# Patient Record
(demographics unavailable — no encounter records)

---

## 2025-02-19 NOTE — ASSESSMENT
[FreeTextEntry1] : Reviewed on July 29, 2024. EKG as noted above. EKG from outside office also reviewed which has shown normal sinus rhythm.  Date of report 11/10/2021. Labs which were done May 23, 2024 had shown hemoglobin A1c 5.4.   HDL 70 total cholesterol 246 triglycerides 65. 10-year ASCVD risk intermediate risk profile.  Reviewed on August 15, 2024. Coronary calcium score and carotid Doppler study reviewed.  Reviewed on February 19, 2025. Recent labs.  2/14/2025.   HDL 78 triglycerides 90 total cholesterol 243 glucose 99 creatinine 0.79 sodium 139 potassium 4.7 LFT normal.

## 2025-02-19 NOTE — DISCUSSION/SUMMARY
[FreeTextEntry1] : 68-year-old female with above medical history active medical problems as noted below 1. Coronary calcium score 0.  Carotid Doppler study without any significant vascular atherosclerosis. Intermediate risk profile lipid panel in spite of aggressive lifestyle and risk factor modification which had improved her prediabetes to normal range. Discussed with her regarding continue lifestyle and risk factor modifications as she has been doing. Repeat lipid panel shows improved LDL cholesterol but still continued to remain higher than 130.  A 10-year ASCVD risk is around 7%. Risk benefits alternatives of lifestyle modification versus addition of statin therapy along with lifestyle modifications was reviewed at length with her.  After reviewing both options she has decided to start low-dose of statin therapy.  Side effects reviewed at length.  Repeat labs to be done on atorvastatin 10 mg in about 6 to 8 weeks as long as no side effects.  Recommended to contact us if there are side effects and stop the medication. #2 aortic valve mild calcification.  Aortic valve sclerosis.  For follow-up in future.Recommend to consider echocardiogram  Counseling regarding low saturated fat, salt and carbohydrate intake was reviewed. Active lifestyle and regular. Exercise along with weight management is advised. All the above were at length reviewed. Answered all the questions. Thank you very much for this kind referral. Please do not hesitate to give me a call for any question. Part of this transcription was done with voice recognition software and phonetically similar errors are common. I apologize for that. Please do not hesitate to call for any questions due to above.  Sincerely,  Sully Jiménez MD, FACC, CHERIE

## 2025-02-19 NOTE — REASON FOR VISIT
[Hyperlipidemia] : hyperlipidemia [FreeTextEntry1] : Consent: Patient consented to virtual video visit. she is home. i am in [facility]/ [place]  Time: A total of 22 minutes was spent in review of pertinent medical records, discussion with the patient, evaluation of the patient problem and coordination of the care plan as part of this online visit.  No physical examination was performed as this visit was performed virtually with exceptions as noted below.  67-year-old female is seen in virtual visit.Recently she was seen for recommendation regarding her dyslipidemia management.  Her other medical history includes prediabetes.  She has been aggressive with her lifestyle in the form of dietary restriction and regular activity exercise and weight reduction over the last 3 years with improved hemoglobin A1c.  But persistent abnormal lipids At baseline she has no significant chest pain, shortness of breath, PND, orthopnea, palpitation, dizziness, near syncopal or syncopal events.  She walks 3 to 5 miles a day.  And has very good dietary restrictions. Her medical history is not significant for hypertension, diabetes mellitus, myocardial infarction, coronary artery disease, cerebrovascular accident, peripheral artery disease, rheumatic fever, thyroid or Lyme disease. She is non-smoker.  No significant alcohol intake.

## 2025-03-25 NOTE — PHYSICAL EXAM
[5___] : hamstring 5[unfilled]/5 [Negative] : negative Parth's [] : patient ambulates without assistive device [Left] : left knee [All Views] : anteroposterior, lateral, skyline, and anteroposterior standing [There are no fractures, subluxations or dislocations. No significant abnormalities are seen] : There are no fractures, subluxations or dislocations. No significant abnormalities are seen [FreeTextEntry8] : Medial and lateral gastroc heads minimally TTP [de-identified] : Cavovarus Bilateral foot deformity [FreeTextEntry9] : MED joint space narrowing. Osteophyte formation. No fractures seen [TWNoteComboBox7] : flexion 135 degrees [de-identified] : extension 0 degrees

## 2025-03-25 NOTE — DISCUSSION/SUMMARY
[de-identified] : Diagnosis Minimal Knee Osteoarthritis with possible Meniscal tear  GIL VAZQUEZ 68 year  F was seen and evaluated in the office today. Following evaluation, and history of the patient's condition at length, the pathology was explained in full to the patient in layman's terms. Patient has Knee Osteoarthritis. Osteoarthritis is a degenerative joint disease where the cartilage in the knee gradually wears away, leading to pain, stiffness, and reduced mobility. Initially, symptoms may be mild, however this is a progressive condition, and the pain is expected to become more severe and persistent. Advanced stages of knee OA can result in significant disability, making daily activities challenging. I discussed with the patient that since this condition is expected to continue to worsen, they will eventually need a total knee replacement in their life time.  In the interim, discussed with patient several different treatment options regarding managing the gradual loss of function associated with knee OA, along with specific risks and benefits. Nonsurgical options including but not limited to Corticosteroid Injection, Visco Supplementation, Meloxicam 15mg, Medrol Dose Pack, along with activity modification such as non-impact exercise and organized physical therapy. The risk of morbidity associated with proposed treatments were discussed.   Furthermore, discussed with GIL that they could also delay any immediate treatment options and continue to observe and self-care for the discussed problem. Discussed Home Exercise Programs as well as Rest, Ice and elevation. Patient had ample time to ask any questions about todays visit and the diagnosis, and all questions were answered. Patient verbally expressed they understand the discussion today and the plan going forward. -- At this time, patient is indicated for physical therapy due to their reduced ROM and weakness. Discussed with patient the benefits of physical therapy due to their current pain and limited function.  -At this time the patient declined physical therapy, reports that they wish to proceed with home exercise program. Discussed proper exercise with patient at this time, demonstrated in office. -- Cavovarus Bilateral foot deformity, wearing braces that support, reports relief of knee pain.  -- After discussion of options the patient expressed a desire to hold off on any of the proposed treatments at this time. As, discussed with patient, the plan is for the patient to observe and continue to self-manage, these including but not limited to HEP, Ice, NSAIDs PRN and rest. Patient was instructed to f/u as needed if symptoms persist/worsen. They expressed understanding of this plan.   -- Patient was advised to follow up in 2-3 months to further evaluate, or sooner if symptoms worsen.

## 2025-03-25 NOTE — HISTORY OF PRESENT ILLNESS
[de-identified] : Date of Injury/Onset:1 month Mechanism of injury: NKI Have you been treated for this in the past? N Have you had surgery for this in the past? N Physical Therapy/ HEP: None OTC Medicines: TYLENOL RX medicines: Heat, Ice, Elevation: None CSI or Gel Injections:  None Other Previous Treatment:  SHE STARTED USING HER ORTHOTICS AGAIN Prior Imaging/Studies:    Pain: At Rest: 0/10 With Activity: 6/10 Quality of symptoms: C/O POSTERIOR KNEE PAIN, NO SWELLING, INSTABILTY   Affecting Sleep: NO Stiffness upon waking, lasting greater than 30mins: Yes Difficulty with stairs: Yes Difficulty getting in and out of car: Yes Sit to stand stiffness: Yes Affects walking short/long distances? Yes Home/Work/Recreation affected? Yes   Improves with: REST Worse with: WALKING/ EXERCISES   This is not a Work-Related Injury being treated under Worker's Compensation. This is not an athletic injury occurring associated with an interscholastic or organized sports team.